# Patient Record
Sex: FEMALE | Race: WHITE | URBAN - METROPOLITAN AREA
[De-identification: names, ages, dates, MRNs, and addresses within clinical notes are randomized per-mention and may not be internally consistent; named-entity substitution may affect disease eponyms.]

---

## 2017-04-14 ENCOUNTER — OFFICE VISIT (OUTPATIENT)
Dept: FAMILY MEDICINE CLINIC | Facility: CLINIC | Age: 57
End: 2017-04-14

## 2017-04-14 VITALS
HEART RATE: 64 BPM | SYSTOLIC BLOOD PRESSURE: 128 MMHG | WEIGHT: 133.63 LBS | DIASTOLIC BLOOD PRESSURE: 70 MMHG | RESPIRATION RATE: 20 BRPM | TEMPERATURE: 98 F

## 2017-04-14 DIAGNOSIS — L98.9 SKIN LESION OF FACE: Primary | ICD-10-CM

## 2017-04-14 PROCEDURE — 99213 OFFICE O/P EST LOW 20 MIN: CPT | Performed by: FAMILY MEDICINE

## 2017-04-14 NOTE — PROGRESS NOTES
[facility] [place]  PROGRESS NOTE  Chief Complaint:   Patient presents with:  Moles: mole on left side of face , past 1.5 month      HPI:   This is a 55year old female presents complaining of skin lesion or right side of the face near her ear. NECK: Supple, no CLAD, no JVD, no thyromegaly. SKIN: Has dry elevated 5 mm papule, firm, slightly hyper pigmented, nontender, no active drainage present.   LYMPHATIC: No cervical lymphadenopathy,          ASSESSMENT AND PLAN:   Rogelio Stiles was seen today for m

## 2017-06-17 ENCOUNTER — MED REC SCAN ONLY (OUTPATIENT)
Dept: FAMILY MEDICINE CLINIC | Facility: CLINIC | Age: 57
End: 2017-06-17

## 2017-07-21 ENCOUNTER — OFFICE VISIT (OUTPATIENT)
Dept: FAMILY MEDICINE CLINIC | Facility: CLINIC | Age: 57
End: 2017-07-21

## 2017-07-21 ENCOUNTER — LAB ENCOUNTER (OUTPATIENT)
Dept: LAB | Age: 57
End: 2017-07-21
Attending: FAMILY MEDICINE
Payer: COMMERCIAL

## 2017-07-21 VITALS
TEMPERATURE: 98 F | SYSTOLIC BLOOD PRESSURE: 126 MMHG | HEIGHT: 60 IN | HEART RATE: 68 BPM | RESPIRATION RATE: 14 BRPM | DIASTOLIC BLOOD PRESSURE: 68 MMHG | BODY MASS INDEX: 25.57 KG/M2 | WEIGHT: 130.25 LBS

## 2017-07-21 DIAGNOSIS — Z00.00 ANNUAL PHYSICAL EXAM: Primary | ICD-10-CM

## 2017-07-21 DIAGNOSIS — N91.2 AMENORRHEA: ICD-10-CM

## 2017-07-21 DIAGNOSIS — Z86.010 HISTORY OF COLONIC POLYPS: ICD-10-CM

## 2017-07-21 DIAGNOSIS — Z00.00 ANNUAL PHYSICAL EXAM: ICD-10-CM

## 2017-07-21 DIAGNOSIS — H91.93 BILATERAL HEARING LOSS, UNSPECIFIED HEARING LOSS TYPE: ICD-10-CM

## 2017-07-21 LAB
ALBUMIN SERPL-MCNC: 3.9 G/DL (ref 3.5–4.8)
ALP LIVER SERPL-CCNC: 66 U/L (ref 41–108)
ALT SERPL-CCNC: 21 U/L (ref 14–54)
AST SERPL-CCNC: 18 U/L (ref 15–41)
BASOPHILS # BLD AUTO: 0.03 X10(3) UL (ref 0–0.1)
BASOPHILS NFR BLD AUTO: 0.6 %
BILIRUB SERPL-MCNC: 0.7 MG/DL (ref 0.1–2)
BILIRUB UR QL STRIP.AUTO: NEGATIVE
BUN BLD-MCNC: 15 MG/DL (ref 8–20)
CALCIUM BLD-MCNC: 8.9 MG/DL (ref 8.3–10.3)
CHLORIDE: 103 MMOL/L (ref 101–111)
CHOLEST SMN-MCNC: 151 MG/DL (ref ?–200)
CLARITY UR REFRACT.AUTO: CLEAR
CO2: 27 MMOL/L (ref 22–32)
COLOR UR AUTO: COLORLESS
CREAT BLD-MCNC: 0.97 MG/DL (ref 0.55–1.02)
EOSINOPHIL # BLD AUTO: 0.09 X10(3) UL (ref 0–0.3)
EOSINOPHIL NFR BLD AUTO: 1.7 %
ERYTHROCYTE [DISTWIDTH] IN BLOOD BY AUTOMATED COUNT: 13 % (ref 11.5–16)
ESTRADIOL: 75.1 PG/ML
FSH: 42.1 MIU/ML
GLUCOSE BLD-MCNC: 90 MG/DL (ref 70–99)
GLUCOSE UR STRIP.AUTO-MCNC: NEGATIVE MG/DL
HCG QUANTITATIVE: <1 MIU/ML (ref ?–1)
HCT VFR BLD AUTO: 41.5 % (ref 34–50)
HDLC SERPL-MCNC: 77 MG/DL (ref 45–?)
HDLC SERPL: 1.96 {RATIO} (ref ?–4.44)
HGB BLD-MCNC: 13.4 G/DL (ref 12–16)
IMMATURE GRANULOCYTE COUNT: 0.01 X10(3) UL (ref 0–1)
IMMATURE GRANULOCYTE RATIO %: 0.2 %
KETONES UR STRIP.AUTO-MCNC: NEGATIVE MG/DL
LDLC SERPL CALC-MCNC: 63 MG/DL (ref ?–130)
LDLC SERPL-MCNC: 11 MG/DL (ref 5–40)
LEUKOCYTE ESTERASE UR QL STRIP.AUTO: NEGATIVE
LH: 50.1 MIU/ML
LYMPHOCYTES # BLD AUTO: 1.45 X10(3) UL (ref 0.9–4)
LYMPHOCYTES NFR BLD AUTO: 28.1 %
M PROTEIN MFR SERPL ELPH: 7.3 G/DL (ref 6.1–8.3)
MCH RBC QN AUTO: 30.7 PG (ref 27–33.2)
MCHC RBC AUTO-ENTMCNC: 32.3 G/DL (ref 31–37)
MCV RBC AUTO: 95.2 FL (ref 81–100)
MONOCYTES # BLD AUTO: 0.46 X10(3) UL (ref 0.1–0.6)
MONOCYTES NFR BLD AUTO: 8.9 %
NEUTROPHIL ABS PRELIM: 3.12 X10 (3) UL (ref 1.3–6.7)
NEUTROPHILS # BLD AUTO: 3.12 X10(3) UL (ref 1.3–6.7)
NEUTROPHILS NFR BLD AUTO: 60.5 %
NITRITE UR QL STRIP.AUTO: NEGATIVE
NONHDLC SERPL-MCNC: 74 MG/DL (ref ?–130)
PH UR STRIP.AUTO: 7 [PH] (ref 4.5–8)
PLATELET # BLD AUTO: 224 10(3)UL (ref 150–450)
POTASSIUM SERPL-SCNC: 4.8 MMOL/L (ref 3.6–5.1)
PROT UR STRIP.AUTO-MCNC: NEGATIVE MG/DL
RBC # BLD AUTO: 4.36 X10(6)UL (ref 3.8–5.1)
RBC UR QL AUTO: NEGATIVE
RED CELL DISTRIBUTION WIDTH-SD: 45 FL (ref 35.1–46.3)
SODIUM SERPL-SCNC: 137 MMOL/L (ref 136–144)
SP GR UR STRIP.AUTO: <1.005 (ref 1–1.03)
TRIGLYCERIDES: 54 MG/DL (ref ?–150)
TSI SER-ACNC: 1.88 MIU/ML (ref 0.35–5.5)
UROBILINOGEN UR STRIP.AUTO-MCNC: <2 MG/DL
WBC # BLD AUTO: 5.2 X10(3) UL (ref 4–13)

## 2017-07-21 PROCEDURE — 82670 ASSAY OF TOTAL ESTRADIOL: CPT | Performed by: FAMILY MEDICINE

## 2017-07-21 PROCEDURE — 80050 GENERAL HEALTH PANEL: CPT | Performed by: FAMILY MEDICINE

## 2017-07-21 PROCEDURE — 83001 ASSAY OF GONADOTROPIN (FSH): CPT | Performed by: FAMILY MEDICINE

## 2017-07-21 PROCEDURE — 99396 PREV VISIT EST AGE 40-64: CPT | Performed by: FAMILY MEDICINE

## 2017-07-21 PROCEDURE — 80061 LIPID PANEL: CPT | Performed by: FAMILY MEDICINE

## 2017-07-21 PROCEDURE — 36415 COLL VENOUS BLD VENIPUNCTURE: CPT | Performed by: FAMILY MEDICINE

## 2017-07-21 PROCEDURE — 83002 ASSAY OF GONADOTROPIN (LH): CPT | Performed by: FAMILY MEDICINE

## 2017-07-21 PROCEDURE — 87624 HPV HI-RISK TYP POOLED RSLT: CPT | Performed by: FAMILY MEDICINE

## 2017-07-21 PROCEDURE — 88175 CYTOPATH C/V AUTO FLUID REDO: CPT | Performed by: FAMILY MEDICINE

## 2017-07-21 PROCEDURE — 84703 CHORIONIC GONADOTROPIN ASSAY: CPT | Performed by: FAMILY MEDICINE

## 2017-07-21 PROCEDURE — 81003 URINALYSIS AUTO W/O SCOPE: CPT | Performed by: FAMILY MEDICINE

## 2017-07-21 NOTE — PROGRESS NOTES
CC: Annual Physical Exam    HPI:   Rudolph June is a 55year old female who presents for a complete physical exam.   Pt with lower abd pain last week- lasted a 2-3 days, now gone, no bowel issues    LMP [place] 15.   Patient concerned is no menstrual cycle Alcohol use: Yes           0.0 oz/week       Comment: Socially    Drug use: No            Social History Narrative        Lives in [place]    Works as an [employer]     Has 1 child         GYNE HX-see hpi            REVIEW OF SYSTEMS:   Jacobo Stephen EOMI, PERRLA, no scleral icterus, conjunctivae clear bilaterally, no eye discharge Ears: TM's clear and intact bilaterally, no excess cerumen or erythema. Nose: patent, no nasal discharge Throat:  No tonsillar erythema or exudate.   Mouth:  No oral lesions , THIN PREP COLLECTION; Future  - CBC WITH DIFFERENTIAL WITH PLATELET; Future  - COMP METABOLIC PANEL (14); Future  - LIPID PANEL;  Future  - TSH W REFLEX TO FREE T4; Future  - URINALYSIS WITH CULTURE REFLEX; Future  - THINPREP PAP SMEAR B  - HPV HIGH RISK

## 2017-07-21 NOTE — PATIENT INSTRUCTIONS
Pt doing well    F.u colonoscopy results with Dr. Jessica Styles    Fasting labs today    Hormone levels - suspect oscar-menopausal

## 2017-07-22 ENCOUNTER — TELEPHONE (OUTPATIENT)
Dept: FAMILY MEDICINE CLINIC | Facility: CLINIC | Age: 57
End: 2017-07-22

## 2017-07-22 NOTE — TELEPHONE ENCOUNTER
Spoke with , pt will come for test results this morning, pt is hearing impaired-  states that pt will not be able to hear on the phone.

## 2017-07-22 NOTE — TELEPHONE ENCOUNTER
----- Message from Ashu Smith MD sent at 7/21/2017  9:32 PM CDT -----  Labs reviewed- negative HGC, hormone levels consistent with perimenopausal state. Lipids and chem panel normal and reassuring. Please call pt results.  Labs ok, pt to monitor m

## 2017-07-24 LAB — HPV I/H RISK 1 DNA SPEC QL NAA+PROBE: NEGATIVE

## 2017-08-01 ENCOUNTER — TELEPHONE (OUTPATIENT)
Dept: FAMILY MEDICINE CLINIC | Facility: CLINIC | Age: 57
End: 2017-08-01

## 2017-08-01 NOTE — TELEPHONE ENCOUNTER
Pt had Bilateral Digital Screening Mammogram done on 7/27/17  Impression: Incomplete: \"The asymmetry in the right breast is indeterminate. \"  Pt informed that additional views with possible ultrasound are recommended.   Pt verbalized understanding, stated

## 2017-08-09 ENCOUNTER — TELEPHONE (OUTPATIENT)
Dept: FAMILY MEDICINE CLINIC | Facility: CLINIC | Age: 57
End: 2017-08-09

## 2017-08-09 DIAGNOSIS — R92.8 ABNORMAL MAMMOGRAM OF RIGHT BREAST: Primary | ICD-10-CM

## 2017-08-09 NOTE — TELEPHONE ENCOUNTER
Orders entered for mammogram and ultrasound of right breast in 6 months- please fax them to [facility]

## 2017-08-09 NOTE — TELEPHONE ENCOUNTER
Mammogram done 8/7/17  Impression:  Probably Benign- \"a follow up right mammogram and an ultrasound in 6 months is recommended to   Demonstrate stability. \"  Pt informed, states she will schedule her f/u appt in Feb.  Flow sheet updated.

## 2018-01-23 ENCOUNTER — OFFICE VISIT (OUTPATIENT)
Dept: FAMILY MEDICINE CLINIC | Facility: CLINIC | Age: 58
End: 2018-01-23

## 2018-01-23 VITALS
SYSTOLIC BLOOD PRESSURE: 136 MMHG | TEMPERATURE: 98 F | DIASTOLIC BLOOD PRESSURE: 64 MMHG | HEIGHT: 60 IN | BODY MASS INDEX: 29.48 KG/M2 | HEART RATE: 62 BPM | WEIGHT: 150.13 LBS | RESPIRATION RATE: 14 BRPM | OXYGEN SATURATION: 97 %

## 2018-01-23 DIAGNOSIS — Z78.0 MENOPAUSE: Primary | ICD-10-CM

## 2018-01-23 DIAGNOSIS — Z00.00 HEALTH CARE MAINTENANCE: ICD-10-CM

## 2018-01-23 PROCEDURE — 99213 OFFICE O/P EST LOW 20 MIN: CPT | Performed by: FAMILY MEDICINE

## 2018-01-23 NOTE — PROGRESS NOTES
[address]  PROGRESS NOTE  Chief Complaint:   Patient presents with:   Follow - Up: 6 Month f/u      HPI:   This is a 65year old female coming in for medical follow up    Pt with mammogram Feb 5th    genereally feeling well      Pt last Urobilinogen Urine <2.0 0.2 - 2.0 mg/dL   Nitrite Urine Negative Negative   Leukocyte Esterase Urine Negative Negative   Microscopic Microscopic not indicated    -CBC W/ DIFFERENTIAL   Result Value Ref Range   WBC 5.2 4.0 - 13.0 x10(3) uL   RBC 4.36 3.80 [OTHER] Father      Allergies:  No Known Allergies  Current Meds:    No current outpatient prescriptions on file. Counseling given: Not Answered       REVIEW OF SYSTEMS:   CONSTITUTIONAL:  Denies unusual weight gain/loss, fever, chills, or fatigue.   EENT EOMI  HEART:  Regular rate and rhythm, no murmurs, rubs or gallops. LUNGS: Clear to auscultation bilterally, no rales/rhonchi/wheezing. .. EXTREMITIES:  No edema    ASSESSMENT AND PLAN:   1. Menopause  Doing well , mild hot flashes    2.  Health care maint

## 2018-07-16 ENCOUNTER — LABORATORY ENCOUNTER (OUTPATIENT)
Dept: LAB | Age: 58
End: 2018-07-16
Attending: FAMILY MEDICINE
Payer: COMMERCIAL

## 2018-07-16 DIAGNOSIS — Z00.00 HEALTH CARE MAINTENANCE: ICD-10-CM

## 2018-07-16 LAB
ALBUMIN SERPL-MCNC: 3.6 G/DL (ref 3.5–4.8)
ALP LIVER SERPL-CCNC: 74 U/L (ref 46–118)
ALT SERPL-CCNC: 26 U/L (ref 14–54)
AST SERPL-CCNC: 24 U/L (ref 15–41)
BASOPHILS # BLD AUTO: 0.03 X10(3) UL (ref 0–0.1)
BASOPHILS NFR BLD AUTO: 0.5 %
BILIRUB SERPL-MCNC: 1.1 MG/DL (ref 0.1–2)
BILIRUB UR QL STRIP.AUTO: NEGATIVE
BUN BLD-MCNC: 21 MG/DL (ref 8–20)
CALCIUM BLD-MCNC: 8.8 MG/DL (ref 8.3–10.3)
CHLORIDE: 105 MMOL/L (ref 101–111)
CHOLEST SMN-MCNC: 146 MG/DL (ref ?–200)
CLARITY UR REFRACT.AUTO: CLEAR
CO2: 26 MMOL/L (ref 22–32)
CREAT BLD-MCNC: 0.95 MG/DL (ref 0.55–1.02)
EOSINOPHIL # BLD AUTO: 0.15 X10(3) UL (ref 0–0.3)
EOSINOPHIL NFR BLD AUTO: 2.6 %
ERYTHROCYTE [DISTWIDTH] IN BLOOD BY AUTOMATED COUNT: 12.7 % (ref 11.5–16)
GLUCOSE BLD-MCNC: 95 MG/DL (ref 70–99)
GLUCOSE UR STRIP.AUTO-MCNC: NEGATIVE MG/DL
HCT VFR BLD AUTO: 40.3 % (ref 34–50)
HDLC SERPL-MCNC: 74 MG/DL (ref 45–?)
HDLC SERPL: 1.97 {RATIO} (ref ?–4.44)
HGB BLD-MCNC: 13.4 G/DL (ref 12–16)
IMMATURE GRANULOCYTE COUNT: 0.01 X10(3) UL (ref 0–1)
IMMATURE GRANULOCYTE RATIO %: 0.2 %
KETONES UR STRIP.AUTO-MCNC: NEGATIVE MG/DL
LDLC SERPL CALC-MCNC: 63 MG/DL (ref ?–130)
LEUKOCYTE ESTERASE UR QL STRIP.AUTO: NEGATIVE
LYMPHOCYTES # BLD AUTO: 1.85 X10(3) UL (ref 0.9–4)
LYMPHOCYTES NFR BLD AUTO: 31.7 %
M PROTEIN MFR SERPL ELPH: 7 G/DL (ref 6.1–8.3)
MCH RBC QN AUTO: 31.6 PG (ref 27–33.2)
MCHC RBC AUTO-ENTMCNC: 33.3 G/DL (ref 31–37)
MCV RBC AUTO: 95 FL (ref 81–100)
MONOCYTES # BLD AUTO: 0.61 X10(3) UL (ref 0.1–1)
MONOCYTES NFR BLD AUTO: 10.4 %
NEUTROPHIL ABS PRELIM: 3.19 X10 (3) UL (ref 1.3–6.7)
NEUTROPHILS # BLD AUTO: 3.19 X10(3) UL (ref 1.3–6.7)
NEUTROPHILS NFR BLD AUTO: 54.6 %
NITRITE UR QL STRIP.AUTO: NEGATIVE
NONHDLC SERPL-MCNC: 72 MG/DL (ref ?–130)
PH UR STRIP.AUTO: 7 [PH] (ref 4.5–8)
PLATELET # BLD AUTO: 243 10(3)UL (ref 150–450)
POTASSIUM SERPL-SCNC: 4.4 MMOL/L (ref 3.6–5.1)
PROT UR STRIP.AUTO-MCNC: NEGATIVE MG/DL
RBC # BLD AUTO: 4.24 X10(6)UL (ref 3.8–5.1)
RBC UR QL AUTO: NEGATIVE
RED CELL DISTRIBUTION WIDTH-SD: 44 FL (ref 35.1–46.3)
SODIUM SERPL-SCNC: 137 MMOL/L (ref 136–144)
SP GR UR STRIP.AUTO: 1.01 (ref 1–1.03)
TRIGL SERPL-MCNC: 44 MG/DL (ref ?–150)
TSI SER-ACNC: 4.29 MIU/ML (ref 0.35–5.5)
UROBILINOGEN UR STRIP.AUTO-MCNC: <2 MG/DL
VLDLC SERPL CALC-MCNC: 9 MG/DL (ref 5–40)
WBC # BLD AUTO: 5.8 X10(3) UL (ref 4–13)

## 2018-07-16 PROCEDURE — 36415 COLL VENOUS BLD VENIPUNCTURE: CPT | Performed by: FAMILY MEDICINE

## 2018-07-16 PROCEDURE — 80050 GENERAL HEALTH PANEL: CPT | Performed by: FAMILY MEDICINE

## 2018-07-16 PROCEDURE — 80061 LIPID PANEL: CPT | Performed by: FAMILY MEDICINE

## 2018-07-16 PROCEDURE — 81003 URINALYSIS AUTO W/O SCOPE: CPT | Performed by: FAMILY MEDICINE

## 2018-07-24 ENCOUNTER — OFFICE VISIT (OUTPATIENT)
Dept: FAMILY MEDICINE CLINIC | Facility: CLINIC | Age: 58
End: 2018-07-24
Payer: COMMERCIAL

## 2018-07-24 VITALS
HEART RATE: 72 BPM | DIASTOLIC BLOOD PRESSURE: 72 MMHG | OXYGEN SATURATION: 100 % | BODY MASS INDEX: 30.9 KG/M2 | WEIGHT: 157.38 LBS | RESPIRATION RATE: 14 BRPM | SYSTOLIC BLOOD PRESSURE: 122 MMHG | HEIGHT: 60 IN | TEMPERATURE: 98 F

## 2018-07-24 DIAGNOSIS — Z00.00 ANNUAL PHYSICAL EXAM: Primary | ICD-10-CM

## 2018-07-24 DIAGNOSIS — Z86.010 HISTORY OF COLONIC POLYPS: ICD-10-CM

## 2018-07-24 DIAGNOSIS — H91.93 BILATERAL HEARING LOSS, UNSPECIFIED HEARING LOSS TYPE: ICD-10-CM

## 2018-07-24 DIAGNOSIS — Z78.0 MENOPAUSE: ICD-10-CM

## 2018-07-24 PROBLEM — N91.2 AMENORRHEA: Status: RESOLVED | Noted: 2017-07-21 | Resolved: 2018-07-24

## 2018-07-24 PROCEDURE — 99396 PREV VISIT EST AGE 40-64: CPT | Performed by: FAMILY MEDICINE

## 2018-07-24 NOTE — PATIENT INSTRUCTIONS
Healthy and doing welll.     Encourage weight loss with diet and exercise    Recheck yearly and if any concerns

## 2018-07-24 NOTE — PROGRESS NOTES
CC: Annual Physical Exam    HPI:   Rebeca Pedro is a 65year old female who presents for a complete physical exam.    Weight up with stress   Recent vacation.    Pt now starting diet      Mother --heart  last year   brither-lung cancer, smoker-  <2.0 0.2 - 2.0 mg/dL   Nitrite Urine Negative Negative   Leukocyte Esterase Urine Negative Negative   Microscopic Microscopic not indicated    -CBC W/ DIFFERENTIAL   Result Value Ref Range   WBC 5.8 4.0 - 13.0 x10(3) uL   RBC 4.24 3.80 - 5.10 x10(6)uL   HG child        Exercise: walking. Diet: watches minimally     REVIEW OF SYSTEMS:   CONSTITUTIONAL:  Denies unusual weight gain/loss, fever, chills, or fatigue. EENT:  Eyes:  Denies eye pain, visual loss, blurred vision, double vision or yellow sclerae.  Ear conjunctivae clear bilaterally, no eye discharge   Ears: TM's clear and intact bilaterally, no excess cerumen or erythema. Nose: patent, no nasal discharge   Throat:  No tonsillar erythema or exudate.     Mouth:  No oral lesions or ulcerations, good denti exercise    Recheck yearly and if any concerns        Annual Depression Screen due on 07/21/2018  Annual Physical due on 07/21/2018     Discussed diet and exercise. Pt' s weight is Body mass index is 30.74 kg/m². , recommended low fat diet and aerobic ex

## 2019-10-10 ENCOUNTER — OFFICE VISIT (OUTPATIENT)
Dept: FAMILY MEDICINE CLINIC | Facility: CLINIC | Age: 59
End: 2019-10-10
Payer: COMMERCIAL

## 2019-10-10 VITALS
TEMPERATURE: 97 F | WEIGHT: 170 LBS | OXYGEN SATURATION: 97 % | HEIGHT: 60 IN | DIASTOLIC BLOOD PRESSURE: 82 MMHG | HEART RATE: 85 BPM | BODY MASS INDEX: 33.38 KG/M2 | SYSTOLIC BLOOD PRESSURE: 150 MMHG

## 2019-10-10 DIAGNOSIS — J02.9 SORE THROAT: ICD-10-CM

## 2019-10-10 DIAGNOSIS — J40 SINOBRONCHITIS: ICD-10-CM

## 2019-10-10 DIAGNOSIS — J32.9 SINOBRONCHITIS: ICD-10-CM

## 2019-10-10 DIAGNOSIS — J00 ACUTE NASOPHARYNGITIS: Primary | ICD-10-CM

## 2019-10-10 PROCEDURE — 87081 CULTURE SCREEN ONLY: CPT | Performed by: NURSE PRACTITIONER

## 2019-10-10 PROCEDURE — 99213 OFFICE O/P EST LOW 20 MIN: CPT | Performed by: NURSE PRACTITIONER

## 2019-10-10 PROCEDURE — 87880 STREP A ASSAY W/OPTIC: CPT | Performed by: NURSE PRACTITIONER

## 2019-10-10 RX ORDER — AZITHROMYCIN 250 MG/1
TABLET, FILM COATED ORAL
Qty: 6 TABLET | Refills: 0 | Status: SHIPPED | OUTPATIENT
Start: 2019-10-10 | End: 2019-10-22 | Stop reason: ALTCHOICE

## 2019-10-10 RX ORDER — BENZONATATE 200 MG/1
200 CAPSULE ORAL 3 TIMES DAILY PRN
Qty: 30 CAPSULE | Refills: 1 | Status: SHIPPED | OUTPATIENT
Start: 2019-10-10 | End: 2019-10-22 | Stop reason: ALTCHOICE

## 2019-10-10 RX ORDER — METHYLPREDNISOLONE 4 MG/1
TABLET ORAL
Qty: 1 KIT | Refills: 0 | Status: SHIPPED | OUTPATIENT
Start: 2019-10-10 | End: 2019-10-22 | Stop reason: ALTCHOICE

## 2019-10-10 NOTE — PATIENT INSTRUCTIONS
Rest, increase fluids, salt water gargles ,myesha pot (use distilled water) or saline nasal spray, Mucinex-DM, Tylenol/Ibuprofen, follow up if symptoms persist or increase.       Follow up for blood pressure check with Dr. Bossman Soria your blood

## 2019-10-14 ENCOUNTER — TELEPHONE (OUTPATIENT)
Dept: FAMILY MEDICINE CLINIC | Facility: CLINIC | Age: 59
End: 2019-10-14

## 2019-10-14 NOTE — TELEPHONE ENCOUNTER
----- Message from JUNIOR Jackson sent at 10/12/2019  1:03 PM CDT -----  Negative strep culture- please notify patient

## 2019-10-22 ENCOUNTER — OFFICE VISIT (OUTPATIENT)
Dept: FAMILY MEDICINE CLINIC | Facility: CLINIC | Age: 59
End: 2019-10-22
Payer: COMMERCIAL

## 2019-10-22 VITALS
HEIGHT: 60 IN | TEMPERATURE: 98 F | WEIGHT: 171.63 LBS | DIASTOLIC BLOOD PRESSURE: 78 MMHG | BODY MASS INDEX: 33.7 KG/M2 | HEART RATE: 74 BPM | SYSTOLIC BLOOD PRESSURE: 136 MMHG | RESPIRATION RATE: 14 BRPM | OXYGEN SATURATION: 97 %

## 2019-10-22 DIAGNOSIS — Z01.31 ENCOUNTER FOR EXAMINATION OF BLOOD PRESSURE WITH ABNORMAL FINDINGS: Primary | ICD-10-CM

## 2019-10-22 DIAGNOSIS — R63.5 WEIGHT GAIN: ICD-10-CM

## 2019-10-22 DIAGNOSIS — R35.0 URINE FREQUENCY: ICD-10-CM

## 2019-10-22 DIAGNOSIS — F43.21 GRIEF: ICD-10-CM

## 2019-10-22 DIAGNOSIS — J06.9 VIRAL UPPER RESPIRATORY TRACT INFECTION: ICD-10-CM

## 2019-10-22 PROCEDURE — 99213 OFFICE O/P EST LOW 20 MIN: CPT | Performed by: FAMILY MEDICINE

## 2019-10-22 NOTE — PATIENT INSTRUCTIONS
Pt to avoid decongestants in the future due to increase BP effect    Pt to return for fasting labs    Encourage healthy diet and exercise for weight reduction

## 2019-10-25 ENCOUNTER — LABORATORY ENCOUNTER (OUTPATIENT)
Dept: LAB | Age: 59
End: 2019-10-25
Attending: FAMILY MEDICINE
Payer: COMMERCIAL

## 2019-10-25 DIAGNOSIS — R35.0 URINE FREQUENCY: ICD-10-CM

## 2019-10-25 DIAGNOSIS — Z01.31 ENCOUNTER FOR EXAMINATION OF BLOOD PRESSURE WITH ABNORMAL FINDINGS: ICD-10-CM

## 2019-10-25 PROCEDURE — 87086 URINE CULTURE/COLONY COUNT: CPT | Performed by: FAMILY MEDICINE

## 2019-10-25 PROCEDURE — 36415 COLL VENOUS BLD VENIPUNCTURE: CPT | Performed by: FAMILY MEDICINE

## 2019-10-25 PROCEDURE — 81001 URINALYSIS AUTO W/SCOPE: CPT | Performed by: FAMILY MEDICINE

## 2019-10-25 PROCEDURE — 80061 LIPID PANEL: CPT | Performed by: FAMILY MEDICINE

## 2019-10-25 PROCEDURE — 80050 GENERAL HEALTH PANEL: CPT | Performed by: FAMILY MEDICINE

## 2019-10-28 ENCOUNTER — PATIENT MESSAGE (OUTPATIENT)
Dept: FAMILY MEDICINE CLINIC | Facility: CLINIC | Age: 59
End: 2019-10-28

## 2019-10-28 ENCOUNTER — TELEPHONE (OUTPATIENT)
Dept: FAMILY MEDICINE CLINIC | Facility: CLINIC | Age: 59
End: 2019-10-28

## 2019-10-28 NOTE — TELEPHONE ENCOUNTER
----- Message from Gaby Calvert MD sent at 10/27/2019  7:46 PM CDT -----  Lab results reviewed   ua- increased skin cells, urine culture negative  Thyroid normal  Chemistry-normal  Cbc- normal  Lipids- slight increase LDL cholesterol- encourage low c

## 2019-10-28 NOTE — TELEPHONE ENCOUNTER
Pt states she has difficulty hearing on the phone. Pt called back with . The pt stated that she would check her my chart account as well.

## 2019-10-29 ENCOUNTER — PATIENT MESSAGE (OUTPATIENT)
Dept: FAMILY MEDICINE CLINIC | Facility: CLINIC | Age: 59
End: 2019-10-29

## 2019-10-29 NOTE — TELEPHONE ENCOUNTER
From: Maris Fregoso  To: Nicolas Buckley MD  Sent: 10/29/2019 12:32 PM CDT  Subject: Test Results Question    Thank you for information. I do appreciate it. See you next year on October.     [facility]

## 2019-10-29 NOTE — TELEPHONE ENCOUNTER
From: David Quach  To: Jeannie Powell MD  Sent: 10/28/2019 8:34 PM CDT  Subject: Test Results Question    I have a question about LIPID PANEL resulted on 10/25/19, 3:58 Vikas Arreola,     What foods should I eat for low cholesterol diet

## 2020-05-20 ENCOUNTER — TELEPHONE (OUTPATIENT)
Dept: FAMILY MEDICINE CLINIC | Facility: CLINIC | Age: 60
End: 2020-05-20

## 2020-05-20 NOTE — TELEPHONE ENCOUNTER
[facility]A sent a request for a copy of pt's medical records from 12/1/2018 to present date. This was [facility] to [facility].

## 2020-05-21 ENCOUNTER — HOSPITAL ENCOUNTER (OUTPATIENT)
Dept: GENERAL RADIOLOGY | Age: 60
Discharge: HOME | End: 2020-05-21
Attending: NURSE PRACTITIONER
Payer: COMMERCIAL

## 2020-05-21 ENCOUNTER — OFFICE VISIT (OUTPATIENT)
Dept: FAMILY MEDICINE CLINIC | Facility: CLINIC | Age: 60
End: 2020-05-21
Payer: COMMERCIAL

## 2020-05-21 VITALS
BODY MASS INDEX: 33.06 KG/M2 | HEIGHT: 60 IN | WEIGHT: 168.38 LBS | SYSTOLIC BLOOD PRESSURE: 144 MMHG | OXYGEN SATURATION: 97 % | DIASTOLIC BLOOD PRESSURE: 80 MMHG | TEMPERATURE: 98 F | RESPIRATION RATE: 16 BRPM | HEART RATE: 78 BPM

## 2020-05-21 DIAGNOSIS — S16.1XXA STRAIN OF NECK MUSCLE, INITIAL ENCOUNTER: Primary | ICD-10-CM

## 2020-05-21 DIAGNOSIS — G89.29 NECK PAIN, CHRONIC: ICD-10-CM

## 2020-05-21 DIAGNOSIS — M54.2 NECK PAIN, CHRONIC: ICD-10-CM

## 2020-05-21 PROCEDURE — 72050 X-RAY EXAM NECK SPINE 4/5VWS: CPT | Performed by: NURSE PRACTITIONER

## 2020-05-21 PROCEDURE — 3079F DIAST BP 80-89 MM HG: CPT | Performed by: NURSE PRACTITIONER

## 2020-05-21 PROCEDURE — 3008F BODY MASS INDEX DOCD: CPT | Performed by: NURSE PRACTITIONER

## 2020-05-21 PROCEDURE — 99214 OFFICE O/P EST MOD 30 MIN: CPT | Performed by: NURSE PRACTITIONER

## 2020-05-21 PROCEDURE — 3077F SYST BP >= 140 MM HG: CPT | Performed by: NURSE PRACTITIONER

## 2020-05-21 RX ORDER — CYCLOBENZAPRINE HCL 5 MG
5 TABLET ORAL 3 TIMES DAILY PRN
Qty: 15 TABLET | Refills: 0 | Status: SHIPPED | OUTPATIENT
Start: 2020-05-21 | End: 2020-10-30

## 2020-05-21 RX ORDER — NAPROXEN 250 MG/1
250 TABLET ORAL 2 TIMES DAILY WITH MEALS
Qty: 20 TABLET | Refills: 0 | Status: SHIPPED | OUTPATIENT
Start: 2020-05-21 | End: 2020-05-31

## 2020-05-21 NOTE — PATIENT INSTRUCTIONS
Neck xray today, will send you [employer] message with results. Anti-inflammatory medication:  Naproxen 250mg twice a day for ten days; take with food.   Muscle Relaxer:  Cyclobenzaprine 5mg every 8 hours as needed for muscle spasms; may make you drowsy, do n bothersome):  · headache  · nausea, vomiting  What may interact with this medicine?   Do not take this medicine with any of the following medications:  · MAOIs like Carbex, Eldepryl, Marplan, Nardil, and Parnate  · narcotic medicines for cough  · safinamide preservatives  · pregnant or trying to get pregnant  · breast-feeding  What should I watch for while using this medicine? Tell your doctor or health care professional if your symptoms do not start to get better or if they get worse.   You may get drowsy or

## 2020-05-21 NOTE — PROGRESS NOTES
[facility] [place]  PROGRESS NOTE  Chief Complaint:   Patient presents with:  Neck Pain: Pt states neck pain for the last year or so      HPI:   This is a 63year old female coming in for neck pain for one to two years.     Symptoms have been pre Current Outpatient Medications   Medication Sig Dispense Refill   • naproxen 250 MG Oral Tab Take 1 tablet (250 mg total) by mouth 2 (two) times daily with meals for 10 days.  20 tablet 0   • cyclobenzaprine 5 MG Oral Tab Take 1 tablet (5 mg total) by mouth 07/21/17 : 130 lb 4 oz (59.1 kg)      Vital signs reviewed. Appears stated age, well groomed. Physical Exam:  GEN:  Patient is alert, awake and oriented, well developed, well nourished, no apparent distress.   Ambulating into room and onto exam table withou - naproxen 250 MG Oral Tab; Take 1 tablet (250 mg total) by mouth 2 (two) times daily with meals for 10 days. Dispense: 20 tablet; Refill: 0  - cyclobenzaprine 5 MG Oral Tab;  Take 1 tablet (5 mg total) by mouth 3 (three) times daily as needed for Muscle s Warm, moist heat application four times a day for twenty minutes at a time, gentle massage to neck. Check with your insurance company to see which local Physical Therapy offices are in your network, then let me know who is in your network. I will then joey · certain medicines for bladder problems like oxybutynin, tolterodine  · certain medicines for depression like amitriptyline, fluoxetine, sertraline  · certain medicines for Parkinson's disease like benztropine, trihexyphenidyl  · certain medicines for sei You may get drowsy or dizzy. Do not drive, use machinery, or do anything that needs mental alertness until you know how this medicine affects you. Do not stand or sit up quickly, especially if you are an older patient.  This reduces the risk of dizzy or tank

## 2020-05-26 ENCOUNTER — PATIENT MESSAGE (OUTPATIENT)
Dept: FAMILY MEDICINE CLINIC | Facility: CLINIC | Age: 60
End: 2020-05-26

## 2020-05-26 DIAGNOSIS — G89.29 NECK PAIN, CHRONIC: ICD-10-CM

## 2020-05-26 DIAGNOSIS — S16.1XXA STRAIN OF NECK MUSCLE, INITIAL ENCOUNTER: Primary | ICD-10-CM

## 2020-05-26 DIAGNOSIS — M54.2 NECK PAIN, CHRONIC: ICD-10-CM

## 2020-05-27 NOTE — TELEPHONE ENCOUNTER
From: Abner Bonds  To: Artist MD Colin  Sent: 5/26/2020 6:47 PM CDT  Subject: Referral Request    Avon Severance,     I need a referral to a female physical therapist at the [facility] [address] in [facility] to help me with neck stretching and e

## 2020-05-27 NOTE — TELEPHONE ENCOUNTER
Referral placed, will have in office staff fax referral.    [address] [place] [address] [address] message sent.

## 2020-06-05 ENCOUNTER — PATIENT MESSAGE (OUTPATIENT)
Dept: FAMILY MEDICINE CLINIC | Facility: CLINIC | Age: 60
End: 2020-06-05

## 2020-06-05 NOTE — TELEPHONE ENCOUNTER
From: Mary Ingram  To: Perlita Guerrero MD  Sent: 6/5/2020 8:20 AM CDT  Subject: Fredrick Estrada,    I got received a letter from [employer] Issa and two weeks ago. They requested medical evidence from you.  They have not

## 2020-06-08 ENCOUNTER — PATIENT MESSAGE (OUTPATIENT)
Dept: FAMILY MEDICINE CLINIC | Facility: CLINIC | Age: 60
End: 2020-06-08

## 2020-06-08 ENCOUNTER — TELEPHONE (OUTPATIENT)
Dept: FAMILY MEDICINE CLINIC | Facility: CLINIC | Age: 60
End: 2020-06-08

## 2020-06-08 NOTE — TELEPHONE ENCOUNTER
I have no forms- Pt last seen by Teysha Vitale, please check there. Could this have been a medical record request?   Otherwise will need signed release and possible forms pt needing filed out.

## 2020-06-08 NOTE — TELEPHONE ENCOUNTER
Message relayed through  through Video. Patient is trying to apply for SSDI. States from her understanding she is just needing a note from Dr. Martin Parents for Verification of her deafness.   Patient unsure if any specific paperwork needs to be filled

## 2020-06-08 NOTE — TELEPHONE ENCOUNTER
Spoke with Bárbara Servin. States she will resend the medical records request that will specify exactly what is needed. Fax number given.

## 2020-06-08 NOTE — TELEPHONE ENCOUNTER
From: Hilary Sorto  To: Nesha Samuels MD  Sent: 6/8/2020 11:17 AM CDT  Subject: Matheus Ordaz,     I just talked to Ciara Boyd. Today. She wanted me tell you that you can call her different number [phone]. She been work from home.  You ca

## 2020-06-08 NOTE — TELEPHONE ENCOUNTER
Shahbaz Purcell, have you seen anything come across for Viktoriya [employer] while you have been covering?

## 2020-06-08 NOTE — TELEPHONE ENCOUNTER
SSA sent a request for a copy of pt's records for date of 12/1/2018 to present. This was sent to [facility].

## 2020-06-23 NOTE — TELEPHONE ENCOUNTER
Attempted to call Rebeca Gorman at [facility]- not able to leave message today. Attempted to call pt, not able to leave message.

## 2020-09-25 ENCOUNTER — TELEPHONE (OUTPATIENT)
Dept: FAMILY MEDICINE CLINIC | Facility: CLINIC | Age: 60
End: 2020-09-25

## 2020-09-25 DIAGNOSIS — Z00.00 ANNUAL PHYSICAL EXAM: Primary | ICD-10-CM

## 2020-09-25 NOTE — TELEPHONE ENCOUNTER
px on 10/30      w/ FBW on  10/23     need BW orders please      OTW      Future Appointments   Date Time Provider Daphne Mcdonald   10/23/2020  9:15 AM RE[facility] [place] REF [facility]   10/30/2020  9:00 AM Mirella Abraham MD [facility] [place] [facility] [place]

## 2020-10-23 ENCOUNTER — LAB ENCOUNTER (OUTPATIENT)
Dept: LAB | Age: 60
End: 2020-10-23
Attending: FAMILY MEDICINE
Payer: COMMERCIAL

## 2020-10-23 DIAGNOSIS — Z00.00 ANNUAL PHYSICAL EXAM: ICD-10-CM

## 2020-10-23 PROCEDURE — 81001 URINALYSIS AUTO W/SCOPE: CPT | Performed by: FAMILY MEDICINE

## 2020-10-23 PROCEDURE — 80050 GENERAL HEALTH PANEL: CPT | Performed by: FAMILY MEDICINE

## 2020-10-23 PROCEDURE — 80061 LIPID PANEL: CPT | Performed by: FAMILY MEDICINE

## 2020-10-23 PROCEDURE — 36415 COLL VENOUS BLD VENIPUNCTURE: CPT | Performed by: FAMILY MEDICINE

## 2020-10-30 ENCOUNTER — OFFICE VISIT (OUTPATIENT)
Dept: FAMILY MEDICINE CLINIC | Facility: CLINIC | Age: 60
End: 2020-10-30
Payer: COMMERCIAL

## 2020-10-30 VITALS
RESPIRATION RATE: 16 BRPM | TEMPERATURE: 97 F | WEIGHT: 162.38 LBS | DIASTOLIC BLOOD PRESSURE: 74 MMHG | HEIGHT: 60 IN | HEART RATE: 80 BPM | OXYGEN SATURATION: 99 % | SYSTOLIC BLOOD PRESSURE: 122 MMHG | BODY MASS INDEX: 31.88 KG/M2

## 2020-10-30 DIAGNOSIS — H91.93 BILATERAL HEARING LOSS, UNSPECIFIED HEARING LOSS TYPE: ICD-10-CM

## 2020-10-30 DIAGNOSIS — Z00.00 ANNUAL PHYSICAL EXAM: Primary | ICD-10-CM

## 2020-10-30 PROCEDURE — 99396 PREV VISIT EST AGE 40-64: CPT | Performed by: FAMILY MEDICINE

## 2020-10-30 PROCEDURE — 3074F SYST BP LT 130 MM HG: CPT | Performed by: FAMILY MEDICINE

## 2020-10-30 PROCEDURE — 3078F DIAST BP <80 MM HG: CPT | Performed by: FAMILY MEDICINE

## 2020-10-30 PROCEDURE — 3008F BODY MASS INDEX DOCD: CPT | Performed by: FAMILY MEDICINE

## 2020-10-30 NOTE — PATIENT INSTRUCTIONS
Patient is past due for mammogram is encouraged to call to have a screening mammogram scheduled through [facility] or [facility]    patient's colonoscopy up-to-date due in 2022.     Patient with recent laboratory studies reviewed all appear to

## 2020-10-30 NOTE — PROGRESS NOTES
CC: Annual Physical Exam    HPI:   John Alva is a 62year old female who presents for a complete physical exam.  Patient complains of ***.        Lab results reviewed-essentially normal, stable    [address] NOTE  Chief C VLDL 12 0 - 30 mg/dL    Non HDL Chol 94 <130 mg/dL    FASTING Yes    TSH W REFLEX TO FREE T4   Result Value Ref Range    TSH 3.210 0.358 - 3.740 mIU/mL   URINALYSIS WITH CULTURE REFLEX    Specimen: Urine   Result Value Ref Range    Urine Color Yellow Janine Hoof Hearing impaired     Due to [place] measles during development   • Menopause     LMP 6/15/2017     No past surgical history on file.   Social History:  Social History    Tobacco Use      Smoking status: Never Smoker      Smokeless tobacco: Never Used    Alco (73.7 kg)   SpO2 99%   BMI 31.72 kg/m²  Estimated body mass index is 31.72 kg/m² as calculated from the following:    Height as of this encounter: 60\". Weight as of this encounter: 162 lb 6.4 oz (73.7 kg). Vital signs reviewed.   Physical Exam:  GEN: physical exam     Menopause      Kyra Ventura MD      [address] NOTE  Chief Complaint:   Patient presents with:   Well Adult      HPI:   This is a 62year old female ***    Has *** been compliant with taking medications o mIU/mL   URINALYSIS WITH CULTURE REFLEX    Specimen: Urine   Result Value Ref Range    Urine Color Yellow Yellow    Clarity Urine Hazy (A) Clear    Spec Gravity 1.014 1.001 - 1.030    Glucose Urine Negative Negative mg/dl    Bilirubin Urine Negative Negati History:  Social History    Tobacco Use      Smoking status: Never Smoker      Smokeless tobacco: Never Used    Alcohol use:  Yes      Alcohol/week: 0.0 standard drinks      Comment: Socially    Drug use: No    Social History    Social History Narrative encounter: 60\". Weight as of this encounter: 162 lb 6.4 oz (73.7 kg). Vital signs reviewed. Physical Exam:  GEN:  Patient is alert, awake and oriented, well developed, well nourished, no acute distress.  ***  EYES:  Sclera anicteric, conjunctiva norm with:  Well Adult      HPI:   This is a 62year old female ***    Has *** been compliant with taking medications on regular basis. Has *** been watching carb in diet.   Exercise: ***  Last  HGBA1C:  No results found for: A1C, EAG  Blood sugar at home has b Clear    Spec Gravity 1.014 1.001 - 1.030    Glucose Urine Negative Negative mg/dl    Bilirubin Urine Negative Negative    Ketones Urine Trace (A) Negative mg/dL    Blood Urine Small (A) Negative    pH Urine 5.0 4.5 - 8.0    Protein Urine Negative Negative Alcohol/week: 0.0 standard drinks      Comment: Socially    Drug use: No    Social History    Social History Narrative            Lives in [place]      Retired, works as an [employer]       Has 1 child    Family History:  Family History   Problem Rela awake and oriented, well developed, well nourished, no acute distress. ***  EYES:  Sclera anicteric, conjunctiva normal, PERRLA, EOMI. NECK: Supple, no carotid bruit, no JVD, no thyromegaly.   LUNGS: Clear to auscultation bilterally, no rales/rhonchi/wheez been watching carb in diet. Exercise: ***  Last  HGBA1C:  No results found for: A1C, EAG  Blood sugar at home has been ranging from ***  Denies any episodes of hypoglycemia. Denies any change in vision. Denies any numbness or tingling in feet.   Eye exam (A) Negative mg/dL    Blood Urine Small (A) Negative    pH Urine 5.0 4.5 - 8.0    Protein Urine Negative Negative mg/dl    Urobilinogen Urine <2.0 0.2 - 2.0 mg/dL    Nitrite Urine Negative Negative    Leukocyte Esterase Urine Negative Negative    WBC Urine Retired, works as an [employer]       Has 1 child    Family History:  Family History   Problem Relation Age of Onset   • Other (Lung cancer) Father    • Other (lung cancer) Sister    • Other (lung cancer) Brother      Allergies:  No Known Allergies  Cur carotid bruit, no JVD, no thyromegaly. LUNGS: Clear to auscultation bilterally, no rales/rhonchi/wheezing. HEART:  Regular rate and rhythm, S1 and S2 are normal, no murmurs, rubs or gallops.   EXTREMITIES: No edema, no cyanosis, no clubbing, FROM, 2+ dors episodes of hypoglycemia. Denies any change in vision. Denies any numbness or tingling in feet. Eye exam was *** done within last year    Has *** been watching salt in diet.   Blood pressure at home has been ranging from ***  Denies any chest pain, edema - 2.0 mg/dL    Nitrite Urine Negative Negative    Leukocyte Esterase Urine Negative Negative    WBC Urine 1-4 <5 /HPF    RBC URINE 0-2 0 - 2 /HPF    Bacteria Urine 1+ (A) None Seen    Squamous Epi.  Cells Large (A) Small /LPF    Renal Tubular Epithelial Carmen • Other (lung cancer) Sister    • Other (lung cancer) Brother      Allergies:  No Known Allergies  Current Meds:  No current outpatient medications on file.       Counseling given: Not Answered         REVIEW OF SYSTEMS:   CONSTITUTIONAL:  Denies unusual are normal, no murmurs, rubs or gallops. EXTREMITIES: No edema, no cyanosis, no clubbing, FROM, 2+ dorsalis pedis pulses bilaterally. ABDOMEN: Soft, nondistended, nontender, bowel sounds normal in all 4 quadrants, no Masses, no hepatosplenomegaly.   SKIN: watching salt in diet. Blood pressure at home has been ranging from ***  Denies any chest pain, edema, shortness of breath, nausea, headache, blury vision or dizziness.     HGBA1C:  No results found for: A1C, EAG     Results for orders placed or performed Bacteria Urine 1+ (A) None Seen    Squamous Epi.  Cells Large (A) Small /LPF    Renal Tubular Epithelial Cells None Seen Small /LPF    Transitional Cells None Seen Small /LPF    Mucous Urine 1+ (A) None Seen    Yeast Urine None Seen None Seen    Non-Squamou medications on file. Counseling given: Not Answered         REVIEW OF SYSTEMS:   CONSTITUTIONAL:  Denies unusual weight gain/loss, fever, chills, or fatigue. ***  EYES:  Denies eye pain, visual loss, blurred vision, double vision or yellow sclerae. bilaterally. ABDOMEN: Soft, nondistended, nontender, bowel sounds normal in all 4 quadrants, no Masses, no hepatosplenomegaly. SKIN: No rashes, no skin lesion, no bruising, good turgor.   MUSCULOSKELETAL: Normal ROM, no joint pain, or muscle weakness in a breath, nausea, headache, blury vision or dizziness.     HGBA1C:  No results found for: A1C, EAG     Results for orders placed or performed in visit on 05/37/03   COMP METABOLIC PANEL (14)   Result Value Ref Range    Glucose 85 70 - 99 mg/dL    Sodium 137 1 Small /LPF    Transitional Cells None Seen Small /LPF    Mucous Urine 1+ (A) None Seen    Yeast Urine None Seen None Seen    Non-Squamous Epithelial None Seen None Seen   CBC W/ DIFFERENTIAL   Result Value Ref Range    WBC 6.2 4.0 - 11.0 x10(3) uL    RBC 4 gain/loss, fever, chills, or fatigue. ***  EYES:  Denies eye pain, visual loss, blurred vision, double vision or yellow sclerae. INTEGUMENTARY:  Denies rashes, itching, skin lesion, or excessive skin dryness.   CARDIOVASCULAR:  Denies chest pain, chest pr rashes, no skin lesion, no bruising, good turgor. MUSCULOSKELETAL: Normal ROM, no joint pain, or muscle weakness in all extremity. NEUROLOGICAL:  No deficit, normal gait, strength and tone, sensory intact, normal reflexes.   PSYCHIATRIC: Alert and orient visit on 63/15/42   COMP METABOLIC PANEL (14)   Result Value Ref Range    Glucose 85 70 - 99 mg/dL    Sodium 137 136 - 145 mmol/L    Potassium 5.1 3.5 - 5.1 mmol/L    Chloride 106 98 - 112 mmol/L    CO2 24.0 21.0 - 32.0 mmol/L    Anion Gap 7 0 - 18 mmol/L Epithelial None Seen None Seen   CBC W/ DIFFERENTIAL   Result Value Ref Range    WBC 6.2 4.0 - 11.0 x10(3) uL    RBC 4.63 3.80 - 5.30 x10(6)uL    HGB 13.7 12.0 - 16.0 g/dL    HCT 43.4 35.0 - 48.0 %    .0 150.0 - 450.0 10(3)uL    MCV 93.7 80.0 - 100. INTEGUMENTARY:  Denies rashes, itching, skin lesion, or excessive skin dryness.   CARDIOVASCULAR:  Denies chest pain, chest pressure, chest discomfort, palpitations, edema, dyspnea on exertion or at rest.  RESPIRATORY:  Denies shortness of breath, wheezin NEUROLOGICAL:  No deficit, normal gait, strength and tone, sensory intact, normal reflexes.   PSYCHIATRIC: Alert and oriented x 3; affect appropriate, no depressed mood or anxiety      ASSESSMENT AND PLAN:   There are no diagnoses linked to this encounter >=60    AST 24 15 - 37 U/L    ALT 20 13 - 56 U/L    Alkaline Phosphatase 89 46 - 118 U/L    Bilirubin, Total 0.6 0.1 - 2.0 mg/dL    Total Protein 7.3 6.4 - 8.2 g/dL    Albumin 3.8 3.4 - 5.0 g/dL    Globulin  3.5 2.8 - 4.4 g/dL    A/G Ratio 1.1 1.0 - 2.0 1. 67 1.00 - 4.00 x10(3) uL    Monocyte Absolute 0.51 0.10 - 1.00 x10(3) uL    Eosinophil Absolute 0.11 0.00 - 0.70 x10(3) uL    Basophil Absolute 0.02 0.00 - 0.20 x10(3) uL    Immature Granulocyte Absolute 0.01 0.00 - 1.00 x10(3) uL    Neutrophil % 62.2 % chest pain, chest pressure, chest discomfort, palpitations, edema, dyspnea on exertion or at rest.  RESPIRATORY:  Denies shortness of breath, wheezing, cough or sputum.   GASTROINTESTINAL:  Denies abdominal pain, nausea, vomiting, constipation, diarrhea, or Clear to auscultation bilterally, no rales/rhonchi/wheezing. BREAST: No skin changes, no palpable abnormality bilaterally  CHEST: No tenderness.   ABDOMEN:  Soft, nondistended, nontender,no masses, no hepatosplenomegaly, bowel sounds normal.  BACK: No tend

## 2020-10-30 NOTE — PROGRESS NOTES
CC: Annual Physical Exam    HPI:   Wanda العرقاي is a 62year old female who presents for a complete physical exam.  Patient complains of increased difficulty hearing. Patient with bilateral hearing aids typically uses lipreading to assist her.   Abimael AST 24 15 - 37 U/L    ALT 20 13 - 56 U/L    Alkaline Phosphatase 89 46 - 118 U/L    Bilirubin, Total 0.6 0.1 - 2.0 mg/dL    Total Protein 7.3 6.4 - 8.2 g/dL    Albumin 3.8 3.4 - 5.0 g/dL    Globulin  3.5 2.8 - 4.4 g/dL    A/G Ratio 1.1 1.0 - 2.0    FAST 1.00 - 4.00 x10(3) uL    Monocyte Absolute 0.51 0.10 - 1.00 x10(3) uL    Eosinophil Absolute 0.11 0.00 - 0.70 x10(3) uL    Basophil Absolute 0.02 0.00 - 0.20 x10(3) uL    Immature Granulocyte Absolute 0.01 0.00 - 1.00 x10(3) uL    Neutrophil % 62.2 %    Ly on exertion or at rest.  RESPIRATORY:  Denies shortness of breath, wheezing, cough or sputum. GASTROINTESTINAL:  Denies abdominal pain, nausea, vomiting, constipation, diarrhea, or blood in stool.   MUSCULOSKELETAL:  Denies weakness, muscle aches, back jim gallops. LUNGS: Clear to auscultation bilterally, no rales/rhonchi/wheezing. BREAST: No skin changes, no palpable abnormality bilaterally  CHEST: No tenderness.   ABDOMEN:  Soft, nondistended, nontender,no masses, no hepatosplenomegaly, bowel sounds rose  services for future visits when masks are required. Discussed diet and exercise. Pt' s weight is Body mass index is 31.72 kg/m². , recommended low fat diet and aerobic exercise 30 minutes three times weekly.   The patient indicates

## 2021-09-03 NOTE — PATIENT INSTRUCTIONS
Pt doing well    Plan for mammogram in February    Recheck 6 months for fasting labs and physical Spoke with patient and patient's responsible party while in the discharge room. Both verbalized understanding of all instructions provided and declined further questions. Provided the patient written paperwork with a summary of the physician's instructions and 24-hour phone number to call.

## 2021-11-18 ENCOUNTER — PATIENT MESSAGE (OUTPATIENT)
Dept: FAMILY MEDICINE CLINIC | Facility: CLINIC | Age: 61
End: 2021-11-18

## 2021-11-19 ENCOUNTER — PATIENT MESSAGE (OUTPATIENT)
Dept: FAMILY MEDICINE CLINIC | Facility: CLINIC | Age: 61
End: 2021-11-19

## 2021-11-19 NOTE — TELEPHONE ENCOUNTER
From: Kerwin Felipe  To: Janelle Dejesus MD  Sent: 11/18/2021 1:46 PM CST  Subject: Jana Melton,    I have received your text message and called [facility] to schedule a mammogram for tomorrow morning at 10am. I just want to let you know.

## 2021-11-19 NOTE — TELEPHONE ENCOUNTER
Appt noted on file.     Future Appointments   Date Time Provider Daphne Mcdonald   12/22/2021 10:45 AM Milton Rosario MD [facility]

## 2021-11-19 NOTE — TELEPHONE ENCOUNTER
From: Maris Fregoso  Sent: 11/19/2021 3:05 PM CST  To: [facility] Clinical Staff  Subject: re: mammogram    Thank you for letting me know. I made an appointment on December 22nd for Physical exam also I would like to get .      Thank you  Ma

## 2021-12-02 ENCOUNTER — PATIENT MESSAGE (OUTPATIENT)
Dept: FAMILY MEDICINE CLINIC | Facility: CLINIC | Age: 61
End: 2021-12-02

## 2021-12-02 ENCOUNTER — TELEPHONE (OUTPATIENT)
Dept: FAMILY MEDICINE CLINIC | Facility: CLINIC | Age: 61
End: 2021-12-02

## 2021-12-02 DIAGNOSIS — Z00.00 ANNUAL PHYSICAL EXAM: Primary | ICD-10-CM

## 2021-12-02 NOTE — TELEPHONE ENCOUNTER
Pt calling to schedule labs- no orders in chart. Pt does not have a good c/b number so she will check in next week to see if these have been placed so she can schedule.       Your appointments     Date & Time Appointment Department [facility])    Dec 22, 2021

## 2021-12-02 NOTE — TELEPHONE ENCOUNTER
Please advise-    Pt is calling to have labs entered to chart prior to her PX. Can labs please be entered for upcoming appt?     Future Appointments   Date Time Provider Daphne Mcdonald   12/22/2021 10:45 AM Morris Browne MD [facility]

## 2021-12-02 NOTE — TELEPHONE ENCOUNTER
From: Wanda العراقي  Sent: 12/2/2021 3:52 PM CST  To: [facility] Clinical Staff  Subject: Lab Orders. Thank you Jhon Shaw! Have a great day!

## 2021-12-07 ENCOUNTER — LABORATORY ENCOUNTER (OUTPATIENT)
Dept: LAB | Age: 61
End: 2021-12-07
Attending: FAMILY MEDICINE
Payer: COMMERCIAL

## 2021-12-07 DIAGNOSIS — R73.09 ELEVATED GLUCOSE: ICD-10-CM

## 2021-12-07 DIAGNOSIS — Z00.00 ANNUAL PHYSICAL EXAM: ICD-10-CM

## 2021-12-07 PROCEDURE — 80061 LIPID PANEL: CPT | Performed by: FAMILY MEDICINE

## 2021-12-07 PROCEDURE — 81003 URINALYSIS AUTO W/O SCOPE: CPT | Performed by: FAMILY MEDICINE

## 2021-12-07 PROCEDURE — 80050 GENERAL HEALTH PANEL: CPT | Performed by: FAMILY MEDICINE

## 2021-12-07 PROCEDURE — 83036 HEMOGLOBIN GLYCOSYLATED A1C: CPT | Performed by: FAMILY MEDICINE

## 2021-12-08 DIAGNOSIS — R73.09 ELEVATED GLUCOSE: Primary | ICD-10-CM

## 2021-12-09 ENCOUNTER — PATIENT MESSAGE (OUTPATIENT)
Dept: FAMILY MEDICINE CLINIC | Facility: CLINIC | Age: 61
End: 2021-12-09

## 2021-12-10 ENCOUNTER — PATIENT MESSAGE (OUTPATIENT)
Dept: FAMILY MEDICINE CLINIC | Facility: CLINIC | Age: 61
End: 2021-12-10

## 2021-12-10 NOTE — TELEPHONE ENCOUNTER
Per THE [facility] lab- there is an reagent shortage to run the lipid panel. THE [facility] lab will be sending lipid panel request to another lab ([facility]) for testing-  Lipid panel therefore is still in process.     My chart message routed to pt

## 2021-12-10 NOTE — TELEPHONE ENCOUNTER
From: Stella Samples  Sent: 12/10/2021 12:46 PM CST  To: [facility] Clinical Staff  Subject: re: lipid panel    Gaetano Casarez,     Thank you for letting me know! Have a great day!      \[facility]\"" [facility]

## 2021-12-10 NOTE — TELEPHONE ENCOUNTER
From: Anitra Salazar  To: Kyra Ventura MD  Sent: 12/9/2021 4:36 PM CST  Subject: Question regarding Sanjuana Olmstead,    Has the results come in yet for my lipids/total cholesterol?     Thank you,  Sharad Sánchez

## 2021-12-20 ENCOUNTER — PATIENT OUTREACH (OUTPATIENT)
Dept: FAMILY MEDICINE CLINIC | Facility: CLINIC | Age: 61
End: 2021-12-20

## 2021-12-22 ENCOUNTER — OFFICE VISIT (OUTPATIENT)
Dept: FAMILY MEDICINE CLINIC | Facility: CLINIC | Age: 61
End: 2021-12-22
Payer: COMMERCIAL

## 2021-12-22 VITALS
WEIGHT: 170.19 LBS | DIASTOLIC BLOOD PRESSURE: 80 MMHG | BODY MASS INDEX: 33.41 KG/M2 | TEMPERATURE: 98 F | HEART RATE: 84 BPM | OXYGEN SATURATION: 96 % | HEIGHT: 60 IN | SYSTOLIC BLOOD PRESSURE: 130 MMHG

## 2021-12-22 DIAGNOSIS — R21 RASH: Primary | ICD-10-CM

## 2021-12-22 DIAGNOSIS — H91.93 BILATERAL HEARING LOSS, UNSPECIFIED HEARING LOSS TYPE: ICD-10-CM

## 2021-12-22 PROCEDURE — 3075F SYST BP GE 130 - 139MM HG: CPT | Performed by: FAMILY MEDICINE

## 2021-12-22 PROCEDURE — 3079F DIAST BP 80-89 MM HG: CPT | Performed by: FAMILY MEDICINE

## 2021-12-22 PROCEDURE — 99214 OFFICE O/P EST MOD 30 MIN: CPT | Performed by: FAMILY MEDICINE

## 2021-12-22 PROCEDURE — 3008F BODY MASS INDEX DOCD: CPT | Performed by: FAMILY MEDICINE

## 2021-12-22 NOTE — PROGRESS NOTES
Kayode Mcfadden is a 62year old female.    Patient presents with:  Rash      HPI:   [facility] is a hearing-impaired patient who is originally scheduled for a physical.  Patient did not disclose that she had a significant rash and illness prior to being placed kg)  10/10/19 : 170 lb (77.1 kg)  07/24/18 : 157 lb 6.4 oz (71.4 kg)    Body mass index is 33.24 kg/m². No current outpatient medications on file.       Past Medical History:   Diagnosis Date   • Elevated blood pressure reading    • Hearing impaired Negative Negative    Microscopic Microscopic not indicated    HEMOGLOBIN A1C   Result Value Ref Range    HgbA1C 5.6 <5.7 %    Estimated Average Glucose 114 68 - 126 mg/dL   LIPID PANEL   Result Value Ref Range    Cholesterol, Total 186 <=199 mg/dL    Trigl or shortness of breath,clear to auscultation  CARDIO: RRR without murmur  GI: good BS's,no masses, HSM or tenderness  EXTREMITIES: no edema    ASSESSMENT AND PLAN:     1. Rash    - SARS-COV-2 BY PCR ([facility]); Future  - SARS-COV-2 BY PCR ([facility])    2.  Bi

## 2021-12-22 NOTE — PATIENT INSTRUCTIONS
rec treat rash with eucerin and oral benadryl    covid test done today    Pt should isoate and wear mask with household contacts until results know  Pt to call for any worening of symptoms- riki if fever or cough    Physical to be rescheduled

## 2021-12-23 ENCOUNTER — PATIENT MESSAGE (OUTPATIENT)
Dept: FAMILY MEDICINE CLINIC | Facility: CLINIC | Age: 61
End: 2021-12-23

## 2021-12-23 ENCOUNTER — TELEPHONE (OUTPATIENT)
Dept: FAMILY MEDICINE CLINIC | Facility: CLINIC | Age: 61
End: 2021-12-23

## 2021-12-23 NOTE — TELEPHONE ENCOUNTER
From: Pepe Austin  To: Michel Ramos MD  Sent: 12/23/2021 2:20 PM CST  Subject: Question regarding SARS-COV-2    I have a question MAEGAN-COV 2 test. What that mean not detected? so that mean negative? That’s correct.     Thank you  Lenka Rivas

## 2021-12-23 NOTE — TELEPHONE ENCOUNTER
----- Message from JUNIOR Flowers sent at 12/23/2021  5:11 PM CST -----  Please make sure patient receives my chart message as below  Your COVID-19 test came back negative you do not have Covid. Thank JUNIOR Anglin, FNP-BC

## 2022-02-02 ENCOUNTER — OFFICE VISIT (OUTPATIENT)
Dept: FAMILY MEDICINE CLINIC | Facility: CLINIC | Age: 62
End: 2022-02-02
Payer: COMMERCIAL

## 2022-02-02 VITALS
HEART RATE: 64 BPM | OXYGEN SATURATION: 99 % | DIASTOLIC BLOOD PRESSURE: 88 MMHG | TEMPERATURE: 99 F | RESPIRATION RATE: 16 BRPM | SYSTOLIC BLOOD PRESSURE: 142 MMHG | HEIGHT: 60 IN | WEIGHT: 167 LBS | BODY MASS INDEX: 32.79 KG/M2

## 2022-02-02 DIAGNOSIS — Z00.00 ANNUAL PHYSICAL EXAM: Primary | ICD-10-CM

## 2022-02-02 DIAGNOSIS — K63.5 POLYP OF COLON, UNSPECIFIED PART OF COLON, UNSPECIFIED TYPE: ICD-10-CM

## 2022-02-02 DIAGNOSIS — H91.93 BILATERAL HEARING LOSS, UNSPECIFIED HEARING LOSS TYPE: ICD-10-CM

## 2022-02-02 DIAGNOSIS — Z86.010 HISTORY OF COLONIC POLYPS: ICD-10-CM

## 2022-02-02 PROCEDURE — 3008F BODY MASS INDEX DOCD: CPT | Performed by: FAMILY MEDICINE

## 2022-02-02 PROCEDURE — 88175 CYTOPATH C/V AUTO FLUID REDO: CPT | Performed by: FAMILY MEDICINE

## 2022-02-02 PROCEDURE — 3079F DIAST BP 80-89 MM HG: CPT | Performed by: FAMILY MEDICINE

## 2022-02-02 PROCEDURE — 3077F SYST BP >= 140 MM HG: CPT | Performed by: FAMILY MEDICINE

## 2022-02-02 PROCEDURE — 99396 PREV VISIT EST AGE 40-64: CPT | Performed by: FAMILY MEDICINE

## 2022-02-02 PROCEDURE — 87624 HPV HI-RISK TYP POOLED RSLT: CPT | Performed by: FAMILY MEDICINE

## 2022-02-02 NOTE — PATIENT INSTRUCTIONS
Colonoscopy this summer- 2022-- DR. Joaquín SANDOVAL TODAY    ENCOURAGE PATIENT TO LOWER GLUCOSE AND SWEETS IN DIET    Consider shingle vaccine      Skin lesions on back are Seborhea keratosis- often caused by sun damamge as a child, can run in families. NOT cancer.  If itchy- apply moisturizing lotion    rec lotrimin or other antifungal creams to red rash in skin folds of lower abdomin    Monitor BP

## 2022-02-03 ENCOUNTER — PATIENT MESSAGE (OUTPATIENT)
Dept: FAMILY MEDICINE CLINIC | Facility: CLINIC | Age: 62
End: 2022-02-03

## 2022-02-03 PROBLEM — Z78.0 MENOPAUSE: Status: RESOLVED | Noted: 2018-01-23 | Resolved: 2022-02-03

## 2022-02-03 LAB — HPV I/H RISK 1 DNA SPEC QL NAA+PROBE: NEGATIVE

## 2022-02-03 NOTE — TELEPHONE ENCOUNTER
From: Dakota Cates  To: Kell Mauricio MD  Sent: 2/3/2022 9:32 AM CST  Subject: Shingles and Colonoscopy     Hi Dr. Drake Tolbert,    I just let you know that I did make call phone this morning. I made an appointment Dr. Sachin Thompson for March 24th for Colonoscopy check up! Please send referral form to him. Ermias[employer] Le[employer] [employer] will cover the shingles vaccination. Should I go to a pharmacy or Dr office for the shot?     Thank you  Felicity Sheppard

## 2022-02-15 ENCOUNTER — NURSE ONLY (OUTPATIENT)
Dept: FAMILY MEDICINE CLINIC | Facility: CLINIC | Age: 62
End: 2022-02-15
Payer: COMMERCIAL

## 2022-02-15 VITALS — TEMPERATURE: 98 F

## 2022-02-15 DIAGNOSIS — Z23 NEED FOR VACCINATION: ICD-10-CM

## 2022-02-15 PROCEDURE — 90750 HZV VACC RECOMBINANT IM: CPT | Performed by: FAMILY MEDICINE

## 2022-02-15 PROCEDURE — 90471 IMMUNIZATION ADMIN: CPT | Performed by: FAMILY MEDICINE

## 2022-02-15 NOTE — PROGRESS NOTES
Patient presents for first Shingrex vaccine, ordered by Dr. Maria De Jesus Guevara. Patient tolerated injection to left deltoid. Patient left office in stable condition.

## 2022-02-16 ENCOUNTER — TELEPHONE (OUTPATIENT)
Dept: FAMILY MEDICINE CLINIC | Facility: CLINIC | Age: 62
End: 2022-02-16

## 2022-02-16 NOTE — TELEPHONE ENCOUNTER
----- Message from JUNIOR De Guzman sent at 2/16/2022  8:05 AM CST -----  Please make sure patient receives my chart message as belowDr. Deepak Miranda I reviewed your Pap smear it is within normal limits and your HPV is negative. I recommend following up for annual physical every year and you can discuss the need for a Pap smear at that time based on your risks-she will likely not be due for another Pap smear for 3 to 5 years. Thank JUNIOR Sapp, FNP-BC

## 2022-08-16 ENCOUNTER — NURSE ONLY (OUTPATIENT)
Dept: FAMILY MEDICINE CLINIC | Facility: CLINIC | Age: 62
End: 2022-08-16
Payer: COMMERCIAL

## 2022-08-16 VITALS — TEMPERATURE: 97 F

## 2022-08-16 DIAGNOSIS — Z23 NEED FOR SHINGLES VACCINE: ICD-10-CM

## 2022-08-16 PROCEDURE — 90750 HZV VACC RECOMBINANT IM: CPT | Performed by: FAMILY MEDICINE

## 2022-08-16 PROCEDURE — 90471 IMMUNIZATION ADMIN: CPT | Performed by: FAMILY MEDICINE

## 2022-08-16 NOTE — PROGRESS NOTES
Pt here for second shingrix vaccine. First injection given 2/15/22. Pt had px on 2/2/22- pt was advised to consider shingles vaccine at that time. Injection given in left deltoid- tolerated well by pt.

## 2022-12-19 ENCOUNTER — PATIENT MESSAGE (OUTPATIENT)
Dept: FAMILY MEDICINE CLINIC | Facility: CLINIC | Age: 62
End: 2022-12-19

## 2022-12-19 DIAGNOSIS — Z12.31 SCREENING MAMMOGRAM FOR BREAST CANCER: Primary | ICD-10-CM

## 2023-01-30 ENCOUNTER — TELEPHONE (OUTPATIENT)
Dept: FAMILY MEDICINE CLINIC | Facility: CLINIC | Age: 63
End: 2023-01-30

## 2023-01-30 DIAGNOSIS — Z00.00 ANNUAL PHYSICAL EXAM: Primary | ICD-10-CM

## 2023-01-30 NOTE — TELEPHONE ENCOUNTER
Last px: 2/2/22  Last labs drawn: 12/7/21      Future Appointments   Date Time Provider Daphne Tamara   3/13/2023  8:30 AM [facility]   3/22/2023 11:00 AM Grace Sosa MD [facility]

## 2023-03-13 ENCOUNTER — LABORATORY ENCOUNTER (OUTPATIENT)
Dept: LAB | Age: 63
End: 2023-03-13
Attending: FAMILY MEDICINE
Payer: COMMERCIAL

## 2023-03-13 DIAGNOSIS — Z00.00 ANNUAL PHYSICAL EXAM: ICD-10-CM

## 2023-03-13 DIAGNOSIS — R73.9 HYPERGLYCEMIA: ICD-10-CM

## 2023-03-13 LAB
ALBUMIN SERPL-MCNC: 3.6 G/DL (ref 3.4–5)
ALBUMIN/GLOB SERPL: 1.2 {RATIO} (ref 1–2)
ALP LIVER SERPL-CCNC: 77 U/L
ALT SERPL-CCNC: 17 U/L
ANION GAP SERPL CALC-SCNC: 5 MMOL/L (ref 0–18)
AST SERPL-CCNC: 17 U/L (ref 15–37)
BASOPHILS # BLD AUTO: 0.03 X10(3) UL (ref 0–0.2)
BASOPHILS NFR BLD AUTO: 0.5 %
BILIRUB SERPL-MCNC: 0.4 MG/DL (ref 0.1–2)
BILIRUB UR QL STRIP.AUTO: NEGATIVE
BUN BLD-MCNC: 15 MG/DL (ref 7–18)
CALCIUM BLD-MCNC: 9.5 MG/DL (ref 8.5–10.1)
CHLORIDE SERPL-SCNC: 110 MMOL/L (ref 98–112)
CHOLEST SERPL-MCNC: 149 MG/DL (ref ?–200)
CO2 SERPL-SCNC: 26 MMOL/L (ref 21–32)
COLOR UR AUTO: YELLOW
CREAT BLD-MCNC: 0.91 MG/DL
EOSINOPHIL # BLD AUTO: 0.15 X10(3) UL (ref 0–0.7)
EOSINOPHIL NFR BLD AUTO: 2.5 %
ERYTHROCYTE [DISTWIDTH] IN BLOOD BY AUTOMATED COUNT: 13.2 %
FASTING PATIENT LIPID ANSWER: YES
FASTING STATUS PATIENT QL REPORTED: YES
GFR SERPLBLD BASED ON 1.73 SQ M-ARVRAT: 72 ML/MIN/1.73M2 (ref 60–?)
GLOBULIN PLAS-MCNC: 3.1 G/DL (ref 2.8–4.4)
GLUCOSE BLD-MCNC: 103 MG/DL (ref 70–99)
GLUCOSE UR STRIP.AUTO-MCNC: NEGATIVE MG/DL
HCT VFR BLD AUTO: 43.2 %
HDLC SERPL-MCNC: 57 MG/DL (ref 40–59)
HGB BLD-MCNC: 13.8 G/DL
IMM GRANULOCYTES # BLD AUTO: 0.01 X10(3) UL (ref 0–1)
IMM GRANULOCYTES NFR BLD: 0.2 %
KETONES UR STRIP.AUTO-MCNC: NEGATIVE MG/DL
LDLC SERPL CALC-MCNC: 82 MG/DL (ref ?–100)
LYMPHOCYTES # BLD AUTO: 1.87 X10(3) UL (ref 1–4)
LYMPHOCYTES NFR BLD AUTO: 30.6 %
MCH RBC QN AUTO: 29.7 PG (ref 26–34)
MCHC RBC AUTO-ENTMCNC: 31.9 G/DL (ref 31–37)
MCV RBC AUTO: 93.1 FL
MONOCYTES # BLD AUTO: 0.47 X10(3) UL (ref 0.1–1)
MONOCYTES NFR BLD AUTO: 7.7 %
NEUTROPHILS # BLD AUTO: 3.59 X10 (3) UL (ref 1.5–7.7)
NEUTROPHILS # BLD AUTO: 3.59 X10(3) UL (ref 1.5–7.7)
NEUTROPHILS NFR BLD AUTO: 58.5 %
NITRITE UR QL STRIP.AUTO: NEGATIVE
NONHDLC SERPL-MCNC: 92 MG/DL (ref ?–130)
OSMOLALITY SERPL CALC.SUM OF ELEC: 293 MOSM/KG (ref 275–295)
PH UR STRIP.AUTO: 6 [PH] (ref 5–8)
PLATELET # BLD AUTO: 253 10(3)UL (ref 150–450)
POTASSIUM SERPL-SCNC: 4.7 MMOL/L (ref 3.5–5.1)
PROT SERPL-MCNC: 6.7 G/DL (ref 6.4–8.2)
PROT UR STRIP.AUTO-MCNC: NEGATIVE MG/DL
RBC # BLD AUTO: 4.64 X10(6)UL
RBC UR QL AUTO: NEGATIVE
SODIUM SERPL-SCNC: 141 MMOL/L (ref 136–145)
SP GR UR STRIP.AUTO: 1.01 (ref 1–1.03)
TRIGL SERPL-MCNC: 48 MG/DL (ref 30–149)
TSI SER-ACNC: 3.61 MIU/ML (ref 0.36–3.74)
UROBILINOGEN UR STRIP.AUTO-MCNC: <2 MG/DL
VLDLC SERPL CALC-MCNC: 8 MG/DL (ref 0–30)
WBC # BLD AUTO: 6.1 X10(3) UL (ref 4–11)

## 2023-03-13 PROCEDURE — 36415 COLL VENOUS BLD VENIPUNCTURE: CPT

## 2023-03-13 PROCEDURE — 80061 LIPID PANEL: CPT

## 2023-03-13 PROCEDURE — 87086 URINE CULTURE/COLONY COUNT: CPT

## 2023-03-13 PROCEDURE — 80053 COMPREHEN METABOLIC PANEL: CPT

## 2023-03-13 PROCEDURE — 81001 URINALYSIS AUTO W/SCOPE: CPT

## 2023-03-13 PROCEDURE — 84443 ASSAY THYROID STIM HORMONE: CPT

## 2023-03-13 PROCEDURE — 83036 HEMOGLOBIN GLYCOSYLATED A1C: CPT

## 2023-03-13 PROCEDURE — 85025 COMPLETE CBC W/AUTO DIFF WBC: CPT

## 2023-03-14 ENCOUNTER — PATIENT MESSAGE (OUTPATIENT)
Dept: FAMILY MEDICINE CLINIC | Facility: CLINIC | Age: 63
End: 2023-03-14

## 2023-03-14 LAB
EST. AVERAGE GLUCOSE BLD GHB EST-MCNC: 120 MG/DL (ref 68–126)
HBA1C MFR BLD: 5.8 % (ref ?–5.7)

## 2023-03-14 NOTE — TELEPHONE ENCOUNTER
From: Yandel Avery  To: Ortega Deluca MD  Sent: 3/14/2023 9:27 AM CDT  Subject: Question regarding LIPID PANEL    Malathi Casarez,   Can you make an appointment for [facility] blood works? I am available on Wednesday or Thursday!  Let me know thanks   [facility] [place]

## 2023-03-14 NOTE — PROGRESS NOTES
[facility] read; additional [facility] sent to have lab done at least 2 days prior to appt with MD

## 2023-03-22 ENCOUNTER — OFFICE VISIT (OUTPATIENT)
Dept: FAMILY MEDICINE CLINIC | Facility: CLINIC | Age: 63
End: 2023-03-22
Payer: MEDICARE

## 2023-03-22 VITALS
BODY MASS INDEX: 29.38 KG/M2 | OXYGEN SATURATION: 97 % | HEART RATE: 76 BPM | SYSTOLIC BLOOD PRESSURE: 136 MMHG | HEIGHT: 61 IN | WEIGHT: 155.63 LBS | RESPIRATION RATE: 18 BRPM | TEMPERATURE: 98 F | DIASTOLIC BLOOD PRESSURE: 76 MMHG

## 2023-03-22 DIAGNOSIS — R73.03 PREDIABETES: ICD-10-CM

## 2023-03-22 DIAGNOSIS — Z11.59 NEED FOR HEPATITIS C SCREENING TEST: ICD-10-CM

## 2023-03-22 DIAGNOSIS — Z00.00 ENCOUNTER FOR ANNUAL HEALTH EXAMINATION: ICD-10-CM

## 2023-03-22 DIAGNOSIS — H91.93 BILATERAL HEARING LOSS, UNSPECIFIED HEARING LOSS TYPE: Primary | ICD-10-CM

## 2023-03-22 DIAGNOSIS — Z86.010 HISTORY OF COLONIC POLYPS: ICD-10-CM

## 2023-03-22 PROCEDURE — G0402 INITIAL PREVENTIVE EXAM: HCPCS | Performed by: FAMILY MEDICINE

## 2023-03-22 PROCEDURE — 90715 TDAP VACCINE 7 YRS/> IM: CPT | Performed by: FAMILY MEDICINE

## 2023-03-22 PROCEDURE — G0403 EKG FOR INITIAL PREVENT EXAM: HCPCS | Performed by: FAMILY MEDICINE

## 2023-03-22 PROCEDURE — 90471 IMMUNIZATION ADMIN: CPT | Performed by: FAMILY MEDICINE

## 2023-03-22 NOTE — PROGRESS NOTES
Pt was given Tdap in right deltoid-  3\"x4\" area of redness noted at injection site-  Localized reaction. No shortness of breath, no chest pain reported by patient. Site also assessed per CR. Pt was advised to take Benadryl otc as needed. Pt was advised to call with any concerns or seek urgent care if any acute s/s are noted pertaining to allergic reaction.

## 2023-03-22 NOTE — PATIENT INSTRUCTIONS
Rec baseline EKG- today- nprmal and reassuring    BP normal and reassuring    Rec tetnus vaccine today- today    Encourage monitor glucose    Continue exercise program  Recheck glucose in 6 months    Lab test - screening for hepatitis C- medicare guideline- can be done with next blood draw      Carline Barrow's SCREENING SCHEDULE   Tests on this list are recommended by your physician but may not be covered, or covered at this frequency, by your insurer. Please check with your insurance carrier before scheduling to verify coverage.    PREVENTATIVE SERVICES FREQUENCY &  COVERAGE DETAILS LAST COMPLETION DATE   Diabetes Screening    Fasting Blood Sugar /  Glucose    One screening every 12 months if never tested or if previously tested but not diagnosed with pre-diabetes   One screening every 6 months if diagnosed with pre-diabetes Lab Results   Component Value Date     (H) 03/13/2023        Cardiovascular Disease Screening    Lipid Panel  Cholesterol  Lipoprotein (HDL)  Triglycerides Covered every 5 years for all Medicare beneficiaries without apparent signs or symptoms of cardiovascular disease Lab Results   Component Value Date    CHOLEST 149 03/13/2023    HDL 57 03/13/2023    LDL 82 03/13/2023    TRIG 48 03/13/2023         Electrocardiogram (EKG)   Covered if needed at Welcome to Medicare, and non-screening if indicated for medical reasons -      Ultrasound Screening for Abdominal Aortic Aneurysm (AAA) Covered once in a lifetime for one of the following risk factors    Men who are 73-68 years old and have ever smoked    Anyone with a family history -     Colorectal Cancer Screening  Covered for ages 52-80; only need ONE of the following:    Colonoscopy   Covered every 10 years    Covered every 2 years if patient is at high risk or previous colonoscopy was abnormal 06/10/2022    Colorectal Cancer Screening due on 06/10/2027    Flexible Sigmoidoscopy   Covered every 4 years -    Fecal Occult Blood Test Covered annually -   Bone Density Screening    Bone density screening    Covered every 2 years after age 72 if diagnosed with risk of osteoporosis or estrogen deficiency. Covered yearly for long-term glucocorticoid medication use (Steroids) No results found for this or any previous visit. No recommendations at this time   Pap and Pelvic    Pap   Covered every 2 years for women at normal risk;  Annually if at high risk 02/02/2022  Pap Smear due on 02/02/2025    Chlamydia Annually if high risk -  No recommendations at this time   Screening Mammogram    Mammogram     Recommend annually for all female patients aged 36 and older    One baseline mammogram covered for patients aged 32-38 01/04/2023    Mammogram due on 01/04/2024    Immunizations    Influenza Covered once per flu season  Please get every year 09/15/2022  No recommendations at this time    Pneumococcal Each vaccine ([id number] & [id number]) covered once after 72 Prevnar 13: -    [id number]: -     No recommendations at this time    Hepatitis B One screening covered for patients with certain risk factors   -  No recommendations at this time    Tetanus Toxoid Not covered by Medicare Part B unless medically necessary (cut with metal); may be covered with your pharmacy prescription benefits -    Tetanus, Diptheria and Pertusis TD and TDaP Not covered by Medicare Part B -  DTaP,Tdap,and Td Vaccines(2 - Td or Tdap) due on 02/06/2022    Zoster Not covered by Medicare Part B; may be covered with your pharmacy  prescription benefits -  No recommendations at this time

## 2023-09-25 ENCOUNTER — LABORATORY ENCOUNTER (OUTPATIENT)
Dept: LAB | Age: 63
End: 2023-09-25
Attending: FAMILY MEDICINE
Payer: MEDICARE

## 2023-09-25 DIAGNOSIS — R73.03 PREDIABETES: ICD-10-CM

## 2023-09-25 DIAGNOSIS — Z11.59 NEED FOR HEPATITIS C SCREENING TEST: ICD-10-CM

## 2023-09-25 LAB
ALBUMIN SERPL-MCNC: 3.7 G/DL (ref 3.4–5)
ALBUMIN/GLOB SERPL: 1 {RATIO} (ref 1–2)
ALP LIVER SERPL-CCNC: 81 U/L
ALT SERPL-CCNC: 28 U/L
ANION GAP SERPL CALC-SCNC: 3 MMOL/L (ref 0–18)
AST SERPL-CCNC: 21 U/L (ref 15–37)
BILIRUB SERPL-MCNC: 0.5 MG/DL (ref 0.1–2)
BUN BLD-MCNC: 25 MG/DL (ref 7–18)
CALCIUM BLD-MCNC: 9.4 MG/DL (ref 8.5–10.1)
CHLORIDE SERPL-SCNC: 109 MMOL/L (ref 98–112)
CO2 SERPL-SCNC: 26 MMOL/L (ref 21–32)
CREAT BLD-MCNC: 0.9 MG/DL
EGFRCR SERPLBLD CKD-EPI 2021: 73 ML/MIN/1.73M2 (ref 60–?)
EST. AVERAGE GLUCOSE BLD GHB EST-MCNC: 114 MG/DL (ref 68–126)
FASTING STATUS PATIENT QL REPORTED: YES
GLOBULIN PLAS-MCNC: 3.7 G/DL (ref 2.8–4.4)
GLUCOSE BLD-MCNC: 96 MG/DL (ref 70–99)
HBA1C MFR BLD: 5.6 % (ref ?–5.7)
HCV AB SERPL QL IA: NONREACTIVE
OSMOLALITY SERPL CALC.SUM OF ELEC: 290 MOSM/KG (ref 275–295)
POTASSIUM SERPL-SCNC: 4.8 MMOL/L (ref 3.5–5.1)
PROT SERPL-MCNC: 7.4 G/DL (ref 6.4–8.2)
SODIUM SERPL-SCNC: 138 MMOL/L (ref 136–145)

## 2023-09-25 PROCEDURE — 86803 HEPATITIS C AB TEST: CPT

## 2023-09-25 PROCEDURE — 80053 COMPREHEN METABOLIC PANEL: CPT

## 2023-09-25 PROCEDURE — 83036 HEMOGLOBIN GLYCOSYLATED A1C: CPT

## 2023-09-25 PROCEDURE — 36415 COLL VENOUS BLD VENIPUNCTURE: CPT

## 2023-09-26 ENCOUNTER — TELEPHONE (OUTPATIENT)
Dept: FAMILY MEDICINE CLINIC | Facility: CLINIC | Age: 63
End: 2023-09-26

## 2023-09-26 NOTE — TELEPHONE ENCOUNTER
Wt Readings from Last 6 Encounters:  03/22/23 : 155 lb 9.6 oz (70.6 kg)  02/02/22 : 167 lb (75.8 kg)  12/22/21 : 170 lb 3.2 oz (77.2 kg)  10/30/20 : 162 lb 6.4 oz (73.7 kg)  05/21/20 : 168 lb 6.4 oz (76.4 kg)  10/22/19 : 171 lb 9.6 oz (77.8 kg)     Reviewed with increased BUN- she could decrease protien drink to 6 oz a day or 11 oz every otherday as long as maintaining weight. If patient having protien source in each meal and weight stable, then decreasing use of protien drinks is appropriate    Pt could consider medical f.u appointment if any further concerns as has not been in since March 2023      No future appointments.

## 2023-09-26 NOTE — TELEPHONE ENCOUNTER
Pt called -using interpretor service. Pt had labs drawn 9/25/23. Pt states she noticed her BUN was elevated. Pt mentioned she has been drinking a Premeir protein drink- 11oz per day. Pt states she was told to supplement her diet. Pt asked if she should stop supplement or decrease intake. MD notes and recommendations attached to lab report shared with pt.

## (undated) NOTE — MR AVS SNAPSHOT
[facility] [address] [place] [address],  [address]  [phone]               Thank you for choosing us for your health care visit with Christine Brar MD.  We are glad to serve you and happy to provide you with this summary of yo authorization numbers or be assured that none are required. You can then schedule your appointment. Failure to obtain required authorization numbers can create reimbursement difficulties for you.         Dr. Mikey Reyes  Address: [address] [employer] [address] [place], [place] [address] for making healthy food choices  -   Enjoy your food, but eat less. Fully enjoy your food when eating. Don’t eat while distracted and slow down. Avoid over sized portions. Don’t eat while when you’re bored.